# Patient Record
Sex: MALE | Race: BLACK OR AFRICAN AMERICAN | NOT HISPANIC OR LATINO | URBAN - METROPOLITAN AREA
[De-identification: names, ages, dates, MRNs, and addresses within clinical notes are randomized per-mention and may not be internally consistent; named-entity substitution may affect disease eponyms.]

---

## 2019-05-08 ENCOUNTER — EMERGENCY (EMERGENCY)
Facility: HOSPITAL | Age: 54
LOS: 1 days | Discharge: HOME | End: 2019-05-08
Attending: EMERGENCY MEDICINE
Payer: COMMERCIAL

## 2019-05-08 VITALS
OXYGEN SATURATION: 98 % | RESPIRATION RATE: 18 BRPM | SYSTOLIC BLOOD PRESSURE: 150 MMHG | HEART RATE: 58 BPM | DIASTOLIC BLOOD PRESSURE: 94 MMHG

## 2019-05-08 VITALS
SYSTOLIC BLOOD PRESSURE: 179 MMHG | HEART RATE: 77 BPM | OXYGEN SATURATION: 97 % | HEIGHT: 74 IN | DIASTOLIC BLOOD PRESSURE: 117 MMHG | RESPIRATION RATE: 16 BRPM | WEIGHT: 240.08 LBS | TEMPERATURE: 99 F

## 2019-05-08 LAB
APPEARANCE UR: CLEAR — SIGNIFICANT CHANGE UP
BACTERIA # UR AUTO: ABNORMAL
BILIRUB UR-MCNC: NEGATIVE — SIGNIFICANT CHANGE UP
COLOR SPEC: YELLOW — SIGNIFICANT CHANGE UP
COMMENT - URINE: SIGNIFICANT CHANGE UP
DIFF PNL FLD: NEGATIVE — SIGNIFICANT CHANGE UP
EPI CELLS # UR: SIGNIFICANT CHANGE UP
GLUCOSE UR QL: NEGATIVE MG/DL — SIGNIFICANT CHANGE UP
HYALINE CASTS # UR AUTO: ABNORMAL /LPF
KETONES UR-MCNC: NEGATIVE — SIGNIFICANT CHANGE UP
LEUKOCYTE ESTERASE UR-ACNC: ABNORMAL
NITRITE UR-MCNC: NEGATIVE — SIGNIFICANT CHANGE UP
PH UR: 6 — SIGNIFICANT CHANGE UP (ref 5–8)
PROT UR-MCNC: 30 MG/DL
RBC CASTS # UR COMP ASSIST: SIGNIFICANT CHANGE UP /HPF (ref 0–4)
SP GR SPEC: 1.01 — SIGNIFICANT CHANGE UP (ref 1.01–1.02)
UROBILINOGEN FLD QL: NEGATIVE MG/DL — SIGNIFICANT CHANGE UP
WBC UR QL: SIGNIFICANT CHANGE UP

## 2019-05-08 PROCEDURE — 96372 THER/PROPH/DIAG INJ SC/IM: CPT

## 2019-05-08 PROCEDURE — 74176 CT ABD & PELVIS W/O CONTRAST: CPT | Mod: 26

## 2019-05-08 PROCEDURE — 99284 EMERGENCY DEPT VISIT MOD MDM: CPT | Mod: 25

## 2019-05-08 PROCEDURE — 99284 EMERGENCY DEPT VISIT MOD MDM: CPT

## 2019-05-08 PROCEDURE — 81001 URINALYSIS AUTO W/SCOPE: CPT

## 2019-05-08 PROCEDURE — 74176 CT ABD & PELVIS W/O CONTRAST: CPT

## 2019-05-08 RX ORDER — CYCLOBENZAPRINE HYDROCHLORIDE 10 MG/1
10 TABLET, FILM COATED ORAL ONCE
Qty: 0 | Refills: 0 | Status: COMPLETED | OUTPATIENT
Start: 2019-05-08 | End: 2019-05-08

## 2019-05-08 RX ORDER — KETOROLAC TROMETHAMINE 30 MG/ML
15 SYRINGE (ML) INJECTION ONCE
Qty: 0 | Refills: 0 | Status: DISCONTINUED | OUTPATIENT
Start: 2019-05-08 | End: 2019-05-08

## 2019-05-08 RX ORDER — METHOCARBAMOL 500 MG/1
1 TABLET, FILM COATED ORAL
Qty: 14 | Refills: 0 | OUTPATIENT
Start: 2019-05-08 | End: 2019-05-14

## 2019-05-08 RX ADMIN — Medication 15 MILLIGRAM(S): at 17:48

## 2019-05-08 RX ADMIN — CYCLOBENZAPRINE HYDROCHLORIDE 10 MILLIGRAM(S): 10 TABLET, FILM COATED ORAL at 17:48

## 2019-05-08 NOTE — ED STATDOCS - CLINICAL SUMMARY MEDICAL DECISION MAKING FREE TEXT BOX
Suspected MSK strain, given mechanism. Pain has been constant, does wrap around,, sometimes internal feeeling. UA, CT renal protocol. Unlikely dissection.

## 2019-05-08 NOTE — ED STATDOCS - ATTENDING CONTRIBUTION TO CARE
Tami: I performed a face to face bedside interview with patient regarding history of present illness, review of symptoms and past medical history. I completed an independent physical exam and ordered tests/medications as needed.  I have discussed patient's plan of care with advanced care provider. The advanced care provider assisted in  executing the discussed plan. I was available for any questions or issues that may have arose during the execution of the plan of care.

## 2019-05-08 NOTE — ED STATDOCS - PHYSICAL EXAMINATION
Gen: No acute distress, non toxic  HEENT: Mucous membranes moist, pink conjunctivae, EOMI  CV: RRR  Resp: CTAB, normal rate and effort  GI: Abdomen soft, NT, ND. No rebound, no guarding  MSK': tenderness long right flank region, towards right inguinal region, slightly worse with movement, slow to stand  Neuro: A&O x 3, moving all 4 extremities, normal strength and sensation of BLE Gen: No acute distress, non toxic  HEENT: Mucous membranes moist, pink conjunctivae, EOMI  CV: RRR equal pulse sb/l LE  Resp: CTAB, normal rate and effort  GI: Abdomen soft, NT, ND. No rebound, no guarding  MSK': tenderness long right flank region, towards right inguinal region, slightly worse with movement, slow to stand  Neuro: A&O x 3, moving all 4 extremities, normal strength and sensation of BLE

## 2019-05-08 NOTE — ED STATDOCS - NS ED ROS FT
Const: No fever/chills.   HEENT: No eye pain/changes in vision, No ear pain/sore throat/dysphagia  CV: No chest pain/palpitations.   Resp: No SOB/cough  GI: No abdominal pain, N/V/D, no black/bloody bm.   : No dysuria/frequency/discharge, + right flank pain  Neuro: No headache. No Dizziness. No numbness/tingling/weakness.  Skin: No rashes or breaks in skin.

## 2019-05-08 NOTE — ED STATDOCS - OBJECTIVE STATEMENT
Pt is a 54 y/o M, with PMHx HTN, presenting to the ED with c/o right flank pain, onset 3 days ago. Pt reports he stood from a sitting position and developed a sudden onset of pain in the right flank, right lateral abd. States the pain progressed to where it took him longer to attempt to get out of bed. Pain has been worsening since, with sxs exacerbated with certain movements. Pt called his PMD regarding the sxs the day following initial onset. Was advised rest, alternating cold and hot compresses, and continue to monitor. Pt has been doing these as directed but states the pain is persistent. Has been waxing and waning but states "it's bad." Denies fever,  pain, urinary sxs, CP, SOB, and any other sxs. Denies direct trauma or falls. Reports he recently had a routine pyhysical with his PMD that was completely normal. Pt has been taking motrin with minimal relief in sxs.

## 2019-05-08 NOTE — ED ADULT NURSE NOTE - OBJECTIVE STATEMENT
pt AOX4 in no apparent distress states he's been having bilaterally flank pain since Sunday with radiation to lower abdomen, pt denies any N/V chills, fevers, painful urination, blood in urine.

## 2019-05-08 NOTE — ED ADULT TRIAGE NOTE - CHIEF COMPLAINT QUOTE
Patient reports right flank pain since Sunday after standing up. Reports that pain is constant and was told to come to the hospital. Patient denies any urinary symptoms.

## 2019-05-08 NOTE — ED STATDOCS - PROGRESS NOTE DETAILS
PT evaluated by intake physician. HPI/ROS/PE as noted above. Will follow up plan per intake physician CT results discussed. Precautions discussed. will d/c with analgesics and PMD follow up PT evaluated by intake physician. HPI/ROS/PE as noted above. Will follow up plan per intake physician CT results including need for follow up for liver lesions / enlarged prostate  discussed. also discussed protein in urine and need for PMD follow up Precautions discussed. will d/c with analgesics and PMD follow up